# Patient Record
Sex: FEMALE | URBAN - METROPOLITAN AREA
[De-identification: names, ages, dates, MRNs, and addresses within clinical notes are randomized per-mention and may not be internally consistent; named-entity substitution may affect disease eponyms.]

---

## 2020-11-16 ENCOUNTER — OFFICE VISIT (OUTPATIENT)
Dept: URBAN - METROPOLITAN AREA CLINIC 54 | Facility: CLINIC | Age: 85
End: 2020-11-16
Payer: MEDICARE

## 2020-11-16 DIAGNOSIS — H35.3212 EXUDATIVE AGE-RELATED MACULAR DEGENERATION, RIGHT EYE, WITH INACTIVE CHOROIDAL NEOVASCULARIZATION: ICD-10-CM

## 2020-11-16 DIAGNOSIS — H43.813 VITREOUS DEGENERATION, BILATERAL: ICD-10-CM

## 2020-11-16 PROCEDURE — 92134 CPTRZ OPH DX IMG PST SGM RTA: CPT | Performed by: OPHTHALMOLOGY

## 2020-11-16 PROCEDURE — 67028 INJECTION EYE DRUG: CPT | Performed by: OPHTHALMOLOGY

## 2020-11-16 PROCEDURE — 92014 COMPRE OPH EXAM EST PT 1/>: CPT | Performed by: OPHTHALMOLOGY

## 2020-11-16 ASSESSMENT — INTRAOCULAR PRESSURE
OD: 13
OS: 15

## 2020-11-16 NOTE — IMPRESSION/PLAN
Impression: Vitreous degeneration, bilateral: H43.813. OU. Plan: Chronic PVD with no retinal tear or retinal detachment in OU.

## 2020-11-16 NOTE — IMPRESSION/PLAN
Impression: Exudative age-rel mclr degn, bi, with inact chrdl neovas: H35.3232. Plan: Last treated with Avastin OD 12 weeks ago and OS  on 01/21/19. Exam/OCT reveals no IRF/SRF/SRH OU. Recommend continuing treatment OD (better seeing eye) and observing OS. Avastin OD administered today. 

RTC: 12 weeks, OCT OU, Reeval Avastin OU

## 2021-02-09 ENCOUNTER — OFFICE VISIT (OUTPATIENT)
Dept: URBAN - METROPOLITAN AREA CLINIC 54 | Facility: CLINIC | Age: 86
End: 2021-02-09
Payer: MEDICARE

## 2021-02-09 DIAGNOSIS — H35.3231 EXUDATIVE AGE-RELATED MACULAR DEGENERATION, BILATERAL, WITH ACTIVE CHOROIDAL NEOVASCULARIZATION: Primary | ICD-10-CM

## 2021-02-09 DIAGNOSIS — H35.3232 EXUDATIVE AGE-REL MCLR DEGN, BI, WITH INACT CHRDL NEOVAS: ICD-10-CM

## 2021-02-09 PROCEDURE — 92012 INTRM OPH EXAM EST PATIENT: CPT | Performed by: OPHTHALMOLOGY

## 2021-02-09 PROCEDURE — 67028 INJECTION EYE DRUG: CPT | Performed by: OPHTHALMOLOGY

## 2021-02-09 PROCEDURE — 92134 CPTRZ OPH DX IMG PST SGM RTA: CPT | Performed by: OPHTHALMOLOGY

## 2021-02-09 ASSESSMENT — INTRAOCULAR PRESSURE
OS: 9
OD: 9

## 2021-05-04 ENCOUNTER — OFFICE VISIT (OUTPATIENT)
Dept: URBAN - METROPOLITAN AREA CLINIC 54 | Facility: CLINIC | Age: 86
End: 2021-05-04
Payer: MEDICARE

## 2021-05-04 PROCEDURE — 92014 COMPRE OPH EXAM EST PT 1/>: CPT | Performed by: OPHTHALMOLOGY

## 2021-05-04 PROCEDURE — 67028 INJECTION EYE DRUG: CPT | Performed by: OPHTHALMOLOGY

## 2021-05-04 PROCEDURE — 92134 CPTRZ OPH DX IMG PST SGM RTA: CPT | Performed by: OPHTHALMOLOGY

## 2021-05-04 ASSESSMENT — INTRAOCULAR PRESSURE
OS: 9
OD: 12

## 2021-07-27 ENCOUNTER — OFFICE VISIT (OUTPATIENT)
Dept: URBAN - METROPOLITAN AREA CLINIC 54 | Facility: CLINIC | Age: 86
End: 2021-07-27
Payer: MEDICARE

## 2021-07-27 DIAGNOSIS — Z96.1 PRESENCE OF PSEUDOPHAKIA: ICD-10-CM

## 2021-07-27 PROCEDURE — 92134 CPTRZ OPH DX IMG PST SGM RTA: CPT | Performed by: OPHTHALMOLOGY

## 2021-07-27 PROCEDURE — 92014 COMPRE OPH EXAM EST PT 1/>: CPT | Performed by: OPHTHALMOLOGY

## 2021-07-27 PROCEDURE — 67028 INJECTION EYE DRUG: CPT | Performed by: OPHTHALMOLOGY

## 2021-07-27 ASSESSMENT — INTRAOCULAR PRESSURE
OS: 17
OD: 10

## 2021-07-27 NOTE — IMPRESSION/PLAN
Impression: Exudative age-rel mclr degn, bi, with inact chrdl neovas: H35.2562. Plan: Last treated with Avastin OD 12 weeks ago and OS  1/21/19. Exam/OCT reveals no IRF/SRF/SRH OU. Recommend continuing treatment OD (better seeing eye) and observing OS. Avastin OD administered today. Will extend treatment intervals to 16 weeks. 

RTC: 16 weeks, OCT OU, Reeval Avastin OU

## 2021-11-15 ENCOUNTER — OFFICE VISIT (OUTPATIENT)
Dept: URBAN - METROPOLITAN AREA CLINIC 54 | Facility: CLINIC | Age: 86
End: 2021-11-15
Payer: MEDICARE

## 2021-11-15 PROCEDURE — 67028 INJECTION EYE DRUG: CPT | Performed by: OPHTHALMOLOGY

## 2021-11-15 PROCEDURE — 92134 CPTRZ OPH DX IMG PST SGM RTA: CPT | Performed by: OPHTHALMOLOGY

## 2021-11-15 PROCEDURE — 92014 COMPRE OPH EXAM EST PT 1/>: CPT | Performed by: OPHTHALMOLOGY

## 2021-11-15 ASSESSMENT — INTRAOCULAR PRESSURE
OS: 10
OD: 10

## 2021-11-15 NOTE — IMPRESSION/PLAN
Impression: Exudative age-rel mclr degn, bi, with inact chrdl neovas: H35.6452. Plan: Last treated with Avastin OD 16 weeks ago on 7/27/21 (1st time) and OS  1/21/19. Exam/OCT reveals no IRF/SRF/SRH OU. Recommend continuing treatment OD (better seeing eye) and observing OS. Avastin OD administered today. Will continue treatment intervals at 16 weeks. 

RTC: 16 weeks, OCT OU, Reeval Avastin OU